# Patient Record
Sex: MALE | Race: WHITE | Employment: OTHER
[De-identification: names, ages, dates, MRNs, and addresses within clinical notes are randomized per-mention and may not be internally consistent; named-entity substitution may affect disease eponyms.]

---

## 2017-03-15 ENCOUNTER — RECORDS - HEALTHEAST (OUTPATIENT)
Dept: ADMINISTRATIVE | Facility: OTHER | Age: 62
End: 2017-03-15

## 2017-03-17 ENCOUNTER — AMBULATORY - HEALTHEAST (OUTPATIENT)
Dept: HEALTH INFORMATION MANAGEMENT | Facility: CLINIC | Age: 62
End: 2017-03-17

## 2017-07-12 ENCOUNTER — RECORDS - HEALTHEAST (OUTPATIENT)
Dept: ADMINISTRATIVE | Facility: OTHER | Age: 62
End: 2017-07-12

## 2017-07-19 ENCOUNTER — RECORDS - HEALTHEAST (OUTPATIENT)
Dept: ADMINISTRATIVE | Facility: OTHER | Age: 62
End: 2017-07-19

## 2021-12-16 NOTE — TELEPHONE ENCOUNTER
FUTURE VISIT INFORMATION      FUTURE VISIT INFORMATION:    Date: 1/12/2022    Time: 9AM    Location: [facility]  REFERRAL INFORMATION:    Referring provider:      Referring providers clinic:      Reason for visit/diagnosis  Check on previous surgical site per pt    RECORDS REQUESTED FROM:       Clinic name Comments Records Status Imaging Status    [facility] Otolaryngology   7/31/2017 note from Simone Toledo Jr., MD     07/15/2005 Left comminuted and displaced anterior table fracture of frontal sinus. 2. Left orbital blowout fracture.  3. Left zygomatic tripod fracture.     [facility]     [facility]  [facility]2017 [facility]  Care everywhere  req 12/16/21 - PACS                             12/16/21 3:48PM sent a fax to [facility] for images - [facility]   12/22/2021 2:27PM images received - [facility]

## 2022-01-12 ENCOUNTER — PRE VISIT (OUTPATIENT)
Dept: OTOLARYNGOLOGY | Facility: CLINIC | Age: 67
End: 2022-01-12

## 2022-01-12 ENCOUNTER — OFFICE VISIT (OUTPATIENT)
Dept: OTOLARYNGOLOGY | Facility: CLINIC | Age: 67
End: 2022-01-12
Payer: COMMERCIAL

## 2022-01-12 VITALS
HEART RATE: 56 BPM | BODY MASS INDEX: 25.48 KG/M2 | HEIGHT: 69 IN | TEMPERATURE: 96.8 F | OXYGEN SATURATION: 98 % | DIASTOLIC BLOOD PRESSURE: 79 MMHG | WEIGHT: 172 LBS | SYSTOLIC BLOOD PRESSURE: 135 MMHG

## 2022-01-12 DIAGNOSIS — M95.2 ACQUIRED DEFORMITY OF HEAD: Primary | ICD-10-CM

## 2022-01-12 PROCEDURE — 99203 OFFICE O/P NEW LOW 30 MIN: CPT | Performed by: OTOLARYNGOLOGY

## 2022-01-12 RX ORDER — FLUOROMETHOLONE 0.1 %
SUSPENSION, DROPS(FINAL DOSAGE FORM)(ML) OPHTHALMIC (EYE)
COMMUNITY
Start: 2021-12-31

## 2022-01-12 RX ORDER — KETOCONAZOLE 20 MG/G
CREAM TOPICAL
COMMUNITY
Start: 2021-10-26

## 2022-01-12 ASSESSMENT — MIFFLIN-ST. JEOR: SCORE: 1545.57

## 2022-01-12 ASSESSMENT — PAIN SCALES - GENERAL: PAINLEVEL: NO PAIN (0)

## 2022-01-12 NOTE — NURSING NOTE
"Chief Complaint   Patient presents with     Consult     check on previous surgical site    Blood pressure 135/79, pulse 56, temperature 96.8  F (36  C), temperature source Temporal, height 1.753 m (5' 9\"), weight 78 kg (172 lb), SpO2 98 %. Valeria Erwin, EMT  "

## 2022-01-12 NOTE — LETTER
1/12/2022       RE: Juice Mendez  [address]  [place] [address]     Dear Colleague,    Thank you for referring your patient, Juice Mendez, to the [facility]IN[facility] [place] at [facility]. Please see a copy of my visit note below.    HISTORY OF PRESENT ILLNESS:  Juice Anderson is a now 66-year-old gentleman who sustained craniofacial trauma in 2005 and underwent open reduction internal fixation of frontal bones.  At that time, is not clear if his frontal sinus was obliterated but from all indications in our discussion today, they were not.  This was done at another facility.    He describes mild pain just over the left brow that comes and goes and is in the specific area where his fixation occurred.  He denies any sinus issues such as drainage.  He has no vision complaints as well.    PAST MEDICAL HISTORY:  Reviewed.    ALLERGIES:  Reviewed.    MEDICATIONS: Reviewed.      REVIEW OF SYSTEMS:  Notes he has had no meningeal signs or symptoms.    SOCIAL HISTORY:  He is a nonsmoker, does not drink alcohol.    PHYSICAL EXAMINATION:  Examination shows scarring above the left brow area.  He does have some mild synkinesis associated with elevation of his brow on that side.  Underneath the skin, there is palpation of bony eminences which may be related to the plates.  At the same time, remainder of the head and neck examination is unremarkable with normal eye mobility, nasal passages and oral cavity.    IMAGING:  Prior imaging notes fixation along the supraorbital rim.  There is an explanation of bossing on that site.    ASSESSMENT:  Status post craniofacial trauma with fixation and now headaches now several years removed.    PLAN:  We will obtain CT scans of the site and call him with the results.  This may lead to surgery and that we need to remove the plates and screws.          Again, thank you for allowing me to participate in  the care of your patient.      Sincerely,    Simone Toledo MD

## 2022-01-12 NOTE — PROGRESS NOTES
HISTORY OF PRESENT ILLNESS:  Juice Anderson is a now 66-year-old gentleman who sustained craniofacial trauma in 2005 and underwent open reduction internal fixation of frontal bones.  At that time, is not clear if his frontal sinus was obliterated but from all indications in our discussion today, they were not.  This was done at another facility.    He describes mild pain just over the left brow that comes and goes and is in the specific area where his fixation occurred.  He denies any sinus issues such as drainage.  He has no vision complaints as well.    PAST MEDICAL HISTORY:  Reviewed.    ALLERGIES:  Reviewed.    MEDICATIONS: Reviewed.      REVIEW OF SYSTEMS:  Notes he has had no meningeal signs or symptoms.    SOCIAL HISTORY:  He is a nonsmoker, does not drink alcohol.    PHYSICAL EXAMINATION:  Examination shows scarring above the left brow area.  He does have some mild synkinesis associated with elevation of his brow on that side.  Underneath the skin, there is palpation of bony eminences which may be related to the plates.  At the same time, remainder of the head and neck examination is unremarkable with normal eye mobility, nasal passages and oral cavity.    IMAGING:  Prior imaging notes fixation along the supraorbital rim.  There is an explanation of bossing on that site.    ASSESSMENT:  Status post craniofacial trauma with fixation and now headaches now several years removed.    PLAN:  We will obtain CT scans of the site and call him with the results.  This may lead to surgery and that we need to remove the plates and screws.

## 2022-01-12 NOTE — LETTER
Date:January 19, 2022      Patient was self referred, no letter generated. Do not send.        [facility] Health Information

## 2022-01-12 NOTE — PATIENT INSTRUCTIONS
1. You were seen in the ENT Clinic today by Dr. Toledo.  If you have any questions or concerns after your appointment, please call   - Option 1: ENT Clinic: [phone]   - Option 2: Rosemarie (Dr. Toledo's Nurse): [phone]          Christine(Dr. Toledo's Nurse): [phone]     2.   Plan to return to clinic after CT by telephone or video    3. CT Sinuses    Rosemarie Ritchie LPN  [facility] - Otolaryngology

## 2022-01-19 ENCOUNTER — ANCILLARY PROCEDURE (OUTPATIENT)
Dept: CT IMAGING | Facility: CLINIC | Age: 67
End: 2022-01-19
Attending: OTOLARYNGOLOGY
Payer: COMMERCIAL

## 2022-01-19 DIAGNOSIS — M95.2 ACQUIRED DEFORMITY OF HEAD: ICD-10-CM

## 2022-01-19 PROCEDURE — 70486 CT MAXILLOFACIAL W/O DYE: CPT | Performed by: RADIOLOGY

## 2022-02-09 ENCOUNTER — VIRTUAL VISIT (OUTPATIENT)
Dept: OTOLARYNGOLOGY | Facility: CLINIC | Age: 67
End: 2022-02-09
Payer: COMMERCIAL

## 2022-02-09 VITALS — HEIGHT: 69 IN | WEIGHT: 172 LBS | BODY MASS INDEX: 25.48 KG/M2

## 2022-02-09 DIAGNOSIS — M95.2 ACQUIRED DEFORMITY OF HEAD: Primary | ICD-10-CM

## 2022-02-09 PROCEDURE — 99212 OFFICE O/P EST SF 10 MIN: CPT | Mod: TEL | Performed by: OTOLARYNGOLOGY

## 2022-02-09 ASSESSMENT — MIFFLIN-ST. JEOR: SCORE: 1540.57

## 2022-02-09 ASSESSMENT — PAIN SCALES - GENERAL: PAINLEVEL: NO PAIN (0)

## 2022-02-09 NOTE — LETTER
2/9/2022       RE: Juice Mendez  [address]  [place] [address]     Dear Colleague,    Thank you for referring your patient, Juice Mendez, to the [facility] EAR NOSE AND THROAT CLINIC [place] at [facility]. Please see a copy of my visit note below.    Juice is a 67 year old who is being evaluated via a billable telephone visit.      What phone number would you like to be contacted at? [phone]    How would you like to obtain your AVS? Mail a copy    HPI   Review of Systems   Physical Exam           Phone call duration: 10 minutes    HISTORY OF PRESENT ILLNESS:  Juice is seen by telephone visit today for approximately 10 minutes.  He had a CT scan of the craniofacial skeleton in lieu of pressure along the left supra brow area.  His history is consistent with craniofacial trauma for which he underwent open reduction internal fixation of his frontal sinus and his left periorbital area.  This was remote.  He has had no issues at this time.    IMAGING:  We obtained a CT scan, which actually shows plates and screws in good position.  There is no extrusion or loosening of any of the screws specifically.      The skin is close to the plates on the forehead itself and that could be problematic in lieu of sensation.  There is no exposure.    Finally, intracranially with the sinus fracture, there was no obvious mucocele seen.  He feels that he is improved since his last visit with us.    ASSESSMENT:  Stable, status post open reduction and internal fixation of craniofacial trauma.  No plate extrusion or mucocele formation.    PLAN:  Recommend followup as needed.          Again, thank you for allowing me to participate in the care of your patient.      Sincerely,    Simone Toledo MD

## 2022-02-09 NOTE — PROGRESS NOTES
Juice is a 67 year old who is being evaluated via a billable telephone visit.      What phone number would you like to be contacted at? [phone]    How would you like to obtain your AVS? Mail a copy    HPI   Review of Systems   Physical Exam           Phone call duration: 10 minutes    HISTORY OF PRESENT ILLNESS:  Juice is seen by telephone visit today for approximately 10 minutes.  He had a CT scan of the craniofacial skeleton in lieu of pressure along the left supra brow area.  His history is consistent with craniofacial trauma for which he underwent open reduction internal fixation of his frontal sinus and his left periorbital area.  This was remote.  He has had no issues at this time.    IMAGING:  We obtained a CT scan, which actually shows plates and screws in good position.  There is no extrusion or loosening of any of the screws specifically.      The skin is close to the plates on the forehead itself and that could be problematic in lieu of sensation.  There is no exposure.    Finally, intracranially with the sinus fracture, there was no obvious mucocele seen.  He feels that he is improved since his last visit with us.    ASSESSMENT:  Stable, status post open reduction and internal fixation of craniofacial trauma.  No plate extrusion or mucocele formation.    PLAN:  Recommend followup as needed.

## 2022-02-09 NOTE — LETTER
Date:February 12, 2022      Patient was self referred, no letter generated. Do not send.        [facility] Health Information

## 2022-02-09 NOTE — PATIENT INSTRUCTIONS
1. You were seen in the ENT Clinic today by Dr. Toledo.  If you have any questions or concerns after your appointment, please call   - Option 1: ENT Clinic: [phone]   - Option 2: Rosemarie (Dr. Toledo's Nurse): [phone]          Christine(Dr. Toledo's Nurse): [phone]     2.   Plan to return to clinic as needed.    Rosemarie Ritchie LPN  [facility] - Otolaryngology